# Patient Record
Sex: FEMALE | URBAN - METROPOLITAN AREA
[De-identification: names, ages, dates, MRNs, and addresses within clinical notes are randomized per-mention and may not be internally consistent; named-entity substitution may affect disease eponyms.]

---

## 2019-10-10 ENCOUNTER — OFFICE VISIT (OUTPATIENT)
Dept: URBAN - METROPOLITAN AREA CLINIC 32 | Facility: CLINIC | Age: 44
End: 2019-10-10
Payer: COMMERCIAL

## 2019-10-10 PROCEDURE — 92002 INTRM OPH EXAM NEW PATIENT: CPT | Performed by: OPHTHALMOLOGY

## 2019-10-10 RX ORDER — HOMATROPINE HYDROBROMIDE OPHTHALMIC 50 MG/ML
5 % SOLUTION OPHTHALMIC
Qty: 5 | Refills: 0 | Status: INACTIVE
Start: 2019-10-10 | End: 2019-11-05

## 2019-10-10 RX ORDER — PREDNISOLONE ACETATE 10 MG/ML
1 % SUSPENSION/ DROPS OPHTHALMIC
Qty: 5 | Refills: 0 | Status: INACTIVE
Start: 2019-10-10 | End: 2019-11-25

## 2019-10-10 ASSESSMENT — INTRAOCULAR PRESSURE
OD: 9
OS: 10

## 2019-10-10 NOTE — IMPRESSION/PLAN
Impression: Iridocyclitis: H20.9. Plan: Dense AC inflammation, KP and nearly bound down pupil -- she is ALICE+ but this is her first attack of iritis -- PF q2h and homatropine bid -- atropine placed in clinic bScan shows flat retina with PVD

## 2019-10-22 ENCOUNTER — OFFICE VISIT (OUTPATIENT)
Dept: URBAN - METROPOLITAN AREA CLINIC 32 | Facility: CLINIC | Age: 44
End: 2019-10-22
Payer: COMMERCIAL

## 2019-10-22 PROCEDURE — 92012 INTRM OPH EXAM EST PATIENT: CPT | Performed by: OPHTHALMOLOGY

## 2019-10-22 RX ORDER — DUREZOL 0.5 MG/ML
0.05 % EMULSION OPHTHALMIC
Qty: 5 | Refills: 1 | Status: INACTIVE
Start: 2019-10-22 | End: 2019-11-05

## 2019-10-22 ASSESSMENT — INTRAOCULAR PRESSURE
OS: 10
OD: 11

## 2019-10-22 NOTE — IMPRESSION/PLAN
Impression: Iridocyclitis: H20.9. Plan: Panuveitis -- Improving AC inflammation OD, but now that pupil dilated can see V very hazy -- OCT shows swollen retina Start durezol 6x per day (sample given and Rx written but told her to use PF if she can't get it) she is ALICE+ but this is her first attack of iritis bScan (10/10/19) shows flat retina with PVD

## 2019-11-05 ENCOUNTER — OFFICE VISIT (OUTPATIENT)
Dept: URBAN - METROPOLITAN AREA CLINIC 32 | Facility: CLINIC | Age: 44
End: 2019-11-05
Payer: COMMERCIAL

## 2019-11-05 DIAGNOSIS — H20.9 IRIDOCYCLITIS: Primary | ICD-10-CM

## 2019-11-05 PROCEDURE — 92012 INTRM OPH EXAM EST PATIENT: CPT | Performed by: OPHTHALMOLOGY

## 2019-11-05 ASSESSMENT — INTRAOCULAR PRESSURE
OS: 10
OD: 10

## 2019-11-05 NOTE — IMPRESSION/PLAN
Impression: Iridocyclitis: H20.9. Plan: Panuveitis OD --  AC inflammation gone , Vit haze also improving, but VA same -- OCT shows swollen retina Durezol since 10/22/19 -- has not helped vision -- recheck monday , consider STK if labs negative Order RPR/VDRL , quant gold, toxoplasmosis IgG, IgM , CBC, ESR, CRP
she is ALICE+ but this is her first attack of iritis DDx: Tb, Syphills, Toxo, vs Inflammatory bScan (10/10/19) shows flat retina with PVD

## 2019-11-25 ENCOUNTER — OFFICE VISIT (OUTPATIENT)
Dept: URBAN - METROPOLITAN AREA CLINIC 32 | Facility: CLINIC | Age: 44
End: 2019-11-25
Payer: COMMERCIAL

## 2019-11-25 PROCEDURE — 92014 COMPRE OPH EXAM EST PT 1/>: CPT | Performed by: OPHTHALMOLOGY

## 2019-11-25 PROCEDURE — 92134 CPTRZ OPH DX IMG PST SGM RTA: CPT | Performed by: OPHTHALMOLOGY

## 2019-11-25 ASSESSMENT — INTRAOCULAR PRESSURE
OD: 11
OS: 11

## 2019-11-25 NOTE — IMPRESSION/PLAN
Impression: Iridocyclitis: H20.9. Plan: Panuveitis OD --  worse given she stopped PF 2w ago RPR and quant Gold positive, LUZ MARINA, CRP elevated Toxoplasmosis IgG, IgM , CBC normal

she is ALICE+ but this is her first attack of iritis bScan (10/10/19) shows flat retina with PVD She has an appointment with PCP Tuesday to follow up labs Recheck 2w -- will plan to restart PF at that time once she is on appropriate systemic antibiotics

## 2019-12-09 ENCOUNTER — OFFICE VISIT (OUTPATIENT)
Dept: URBAN - METROPOLITAN AREA CLINIC 32 | Facility: CLINIC | Age: 44
End: 2019-12-09
Payer: COMMERCIAL

## 2019-12-09 PROCEDURE — 92014 COMPRE OPH EXAM EST PT 1/>: CPT | Performed by: OPHTHALMOLOGY

## 2019-12-09 ASSESSMENT — INTRAOCULAR PRESSURE
OD: 11
OS: 11

## 2019-12-09 NOTE — IMPRESSION/PLAN
Impression: Iridocyclitis: H20.9. Plan: Panuveitis OD -- got IM Abx for pos RPR ; CXR clean (had pos quant Gold) AC inflammation improving spontaneously but persistent V haze -- restart durezol 6x per day (sample given) Recheck 1m

LUZ MARINA, CRP elevated Toxoplasmosis IgG, IgM , CBC normal

she is ALICE+ but this is her first attack of iritis bScan (10/10/19) shows flat retina with PVD

## 2020-01-20 ENCOUNTER — OFFICE VISIT (OUTPATIENT)
Dept: URBAN - METROPOLITAN AREA CLINIC 32 | Facility: CLINIC | Age: 45
End: 2020-01-20
Payer: COMMERCIAL

## 2020-01-20 PROCEDURE — 92134 CPTRZ OPH DX IMG PST SGM RTA: CPT | Performed by: OPHTHALMOLOGY

## 2020-01-20 PROCEDURE — 92014 COMPRE OPH EXAM EST PT 1/>: CPT | Performed by: OPHTHALMOLOGY

## 2020-01-20 PROCEDURE — 99214 OFFICE O/P EST MOD 30 MIN: CPT | Performed by: OPHTHALMOLOGY

## 2020-01-20 ASSESSMENT — INTRAOCULAR PRESSURE
OD: 11
OS: 11

## 2020-01-20 NOTE — IMPRESSION/PLAN
Impression: Iridocyclitis: H20.9. Plan: Panuveitis OD -- much improved today -- OCT looks good AC inflammation improving spontaneously but much improved  V haze -- Pt self D/C'd  durezol 

got IM Abx for pos RPR ; CXR clean (had pos quant Gold) Recheck 6 wks OCT DE or sooner LUZ MARINA, CRP elevated Toxoplasmosis IgG, IgM , CBC normal

she is ALICE+ but this is her first attack of iritis bScan (10/10/19) shows flat retina with PVD

## 2023-03-30 ENCOUNTER — OFFICE VISIT (OUTPATIENT)
Dept: URBAN - NONMETROPOLITAN AREA CLINIC 3 | Facility: CLINIC | Age: 48
End: 2023-03-30
Payer: COMMERCIAL

## 2023-03-30 DIAGNOSIS — H44.111 PANUVEITIS, RIGHT EYE: Primary | ICD-10-CM

## 2023-03-30 PROCEDURE — 99204 OFFICE O/P NEW MOD 45 MIN: CPT | Performed by: STUDENT IN AN ORGANIZED HEALTH CARE EDUCATION/TRAINING PROGRAM

## 2023-03-30 RX ORDER — DUREZOL 0.5 MG/ML
0.05 % EMULSION OPHTHALMIC
Qty: 5 | Refills: 0 | Status: ACTIVE
Start: 2023-03-30

## 2023-03-30 ASSESSMENT — INTRAOCULAR PRESSURE
OS: 15
OD: 12

## 2023-03-30 NOTE — IMPRESSION/PLAN
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 Plan: Patient ed on findings. Currently on tx for syphilis (3/3rd round of shots to be taken this week with PCP). Patient has hx of no showing and d/c drops. Per patient, may have been smoldering for 3+ years now as vision has been poor since last visit. Start durezol 6x/day OD, retina consult next available. Retina attached 360.
Nutrition, metabolism, and development symptoms
Palliative care by specialist